# Patient Record
Sex: FEMALE | Race: WHITE | NOT HISPANIC OR LATINO | URBAN - NONMETROPOLITAN AREA
[De-identification: names, ages, dates, MRNs, and addresses within clinical notes are randomized per-mention and may not be internally consistent; named-entity substitution may affect disease eponyms.]

---

## 2018-09-04 PROBLEM — H52.4: Noted: 2021-09-13

## 2018-09-04 PROBLEM — H40.013: Noted: 2018-09-04

## 2018-09-04 PROBLEM — H00.014: Noted: 2021-03-11

## 2018-09-04 PROBLEM — H52.03: Noted: 2019-09-12

## 2018-09-04 PROBLEM — H52.4: Noted: 2021-03-11

## 2018-09-04 PROBLEM — H25.13: Noted: 2018-09-04

## 2018-09-04 PROBLEM — H52.4: Noted: 2018-09-04

## 2018-09-04 PROBLEM — H52.4: Noted: 2019-09-12

## 2018-09-04 PROBLEM — H52.03: Noted: 2021-03-11

## 2018-09-04 PROBLEM — H52.03: Noted: 2021-09-13

## 2019-03-04 ENCOUNTER — IMPORTED ENCOUNTER (OUTPATIENT)
Dept: URBAN - NONMETROPOLITAN AREA CLINIC 1 | Facility: CLINIC | Age: 69
End: 2019-03-04

## 2019-03-04 PROCEDURE — 92133 CPTRZD OPH DX IMG PST SGM ON: CPT

## 2019-03-04 PROCEDURE — 76514 ECHO EXAM OF EYE THICKNESS: CPT

## 2019-03-04 PROCEDURE — 99213 OFFICE O/P EST LOW 20 MIN: CPT

## 2019-03-04 NOTE — PATIENT DISCUSSION
Borderline Glaucoma OU-  discussed findings w/patient-  no known family history-  IOPs stable at 14 OU-  C/D 0.7 OD and 0.8 OS-  Pach done today 508 504-  OCT disc done today OD: R. severe thinning superior OS: R. normal RNFL-  24-2 VF done on 8/30/2018. OD: reliable with scattered defects; OS: reliable with scattered defects. There are no glaucomatous defects noted at this time. -  continue to monitor every 6 months for Complete w/ 24-2 VF and Fundus PhotosNuclear Sclerosis OU -  discussed findings w/patient-  no treatment indicated at this time -  UV protection recommended-  monitor 6 month Complete or prn; 's Notes: MR TERRY OCT disc 3/4/2019Pach 3/1/2019 [place] 508 504VF 24-2 8/30/2018

## 2019-09-12 ENCOUNTER — IMPORTED ENCOUNTER (OUTPATIENT)
Dept: URBAN - NONMETROPOLITAN AREA CLINIC 1 | Facility: CLINIC | Age: 69
End: 2019-09-12

## 2019-09-12 PROCEDURE — 92014 COMPRE OPH EXAM EST PT 1/>: CPT

## 2019-09-12 PROCEDURE — 92015 DETERMINE REFRACTIVE STATE: CPT

## 2019-09-12 PROCEDURE — 92083 EXTENDED VISUAL FIELD XM: CPT

## 2019-09-12 NOTE — PATIENT DISCUSSION
Borderline Glaucoma OU-  discussed findings w/patient-  no known family history-  IOPs stable at 15 OU-  C/D 0.7 OD and 0.8 OS-  Fundus Photos done today. Baseline OD: 0.7/0.7 suspicious cupping OS: 0.8/0.8 suspicious cupping C/D asymmetry-  24-2 VF done today. OD: R scattered/non-specific defects; OS: R scattered/non-specific defects. VF results were stable from previous last year. -  Pach done 3/1/2019 508 504-  OCT disc done today OD: R. severe thinning superior OS: R. normal RNFL-  24-2 VF done on 8/30/2018. OD: reliable with scattered defects; OS: reliable with scattered defects. There are no glaucomatous defects noted at this time. -  continue to monitor every 6 months f/u BL GL w/ OCT ONH and Gonio Nuclear Sclerosis OU -  discussed findings w/patient-  no treatment indicated at this time stable from previous-  UV protection recommended-  monitor 6 month f/u or prn Simple Hyperopia OU w/Presbyopia-  discussed findings w/patient-  new spectacle Rx issued-  monitor yearly or prn; 's Notes: MR 9/12/2019DFE OCT disc 3/4/2019Pach 3/1/2019 [place] 508 504VF 24-2 9/12/2019Fundus Photos 9/12/2019

## 2020-03-12 ENCOUNTER — IMPORTED ENCOUNTER (OUTPATIENT)
Dept: URBAN - NONMETROPOLITAN AREA CLINIC 1 | Facility: CLINIC | Age: 70
End: 2020-03-12

## 2020-03-12 PROCEDURE — 92133 CPTRZD OPH DX IMG PST SGM ON: CPT

## 2020-03-12 PROCEDURE — 92020 GONIOSCOPY: CPT

## 2020-03-12 PROCEDURE — 99213 OFFICE O/P EST LOW 20 MIN: CPT

## 2020-03-12 NOTE — PATIENT DISCUSSION
Borderline Glaucoma OU-  discussed findings w/patient-  no known family history-  IOPs stable at 15 OU-  C/D 0.7 OD and 0.8 OS-  Fundus Photos done today. Baseline OD: 0.7/0.7 suspicious cupping OS: 0.8/0.8 suspicious cupping C/D asymmetry-  24-2 VF done today. OD: R scattered/non-specific defects; OS: R scattered/non-specific defects. VF results were stable from previous last year. -  Pach done 3/1/2019 508 504-  OCT disc done today OD: 10/10SS 87 um normal RNFL  OS: 9/10 SS 85 um normal RNFL -  24-2 VF done on 8/30/2018. OD: reliable with scattered defects; OS: reliable with scattered defects. There are no glaucomatous defects noted at this time. -  continue to monitor every 6 months Complete w/ 24-2 VF and Fundus PhotosNuclear Sclerosis OU -  discussed findings w/patient-  no treatment indicated at this time stable from previous-  UV protection recommended-  monitor 6 month f/u or prn Simple Hyperopia OU w/Presbyopia-  discussed findings w/patient-  new spectacle Rx issued-  monitor yearly or prn; 's Notes: MR 9/12/2019DFE Gonio 3/12/2020OCT disc 3/12/2020Pach 3/1/2019 [place] 508 504VF 24-2 9/12/2019Fundus Photos 9/12/2019

## 2020-09-10 ENCOUNTER — IMPORTED ENCOUNTER (OUTPATIENT)
Dept: URBAN - NONMETROPOLITAN AREA CLINIC 1 | Facility: CLINIC | Age: 70
End: 2020-09-10

## 2020-09-10 PROCEDURE — 92014 COMPRE OPH EXAM EST PT 1/>: CPT

## 2020-09-10 PROCEDURE — 92015 DETERMINE REFRACTIVE STATE: CPT

## 2020-09-10 PROCEDURE — 92083 EXTENDED VISUAL FIELD XM: CPT

## 2020-09-10 NOTE — PATIENT DISCUSSION
Borderline Glaucoma OU-  Discussed findings w/patient-  No known family history-  IOPs better today at 13 OU-  C/D 0.7 OD and 0.8 OS-  Fundus Photos done 9/12/2019 baseline OD: 0.7/0.7 suspicious cupping OS: 0.8/0.8 suspicious cupping C/D asymmetry-  24-2 VF done 9/10/2020OD: R scattered/non-specific defects; OS: R scattered/non-specific defects. VF results were stable from previous last year. -  Pach done 3/1/2019:  OD:  508 OS:  504-  OCT disc done previously:  OD: 10/10SS 87 um normal RNFL  OS: 9/10 SS 85 um normal RNFL -  RTC 6 mo f/u glaucoma suspect w/ OCTNuclear Sclerosis OU -  discussed findings w/patient-  no treatment indicated at this time stable from previous-  UV protection recommended-  monitor 6 month f/u or prn Simple Hyperopia OU w/Presbyopia-  discussed findings w/patient-  new spectacle Rx issued-  monitor yearly or prn; 's Notes: MR 9/10/2020DFE 9/10/2020Gonio 3/12/2020OCT disc 3/12/2020Pach 3/1/2019 [place] 508 504VF 24-2 9/10/2020Fundus Photos 9/12/2019

## 2021-03-11 ENCOUNTER — IMPORTED ENCOUNTER (OUTPATIENT)
Dept: URBAN - NONMETROPOLITAN AREA CLINIC 1 | Facility: CLINIC | Age: 71
End: 2021-03-11

## 2021-03-11 PROCEDURE — 92133 CPTRZD OPH DX IMG PST SGM ON: CPT

## 2021-03-11 PROCEDURE — 92014 COMPRE OPH EXAM EST PT 1/>: CPT

## 2021-03-11 NOTE — PATIENT DISCUSSION
Borderline Glaucoma OU-  Discussed findings w/patient-  No known family history-  IOPs better today at 1314 stable-  C/D 0.7 OD and 0.8 OS-  Fundus Photos done 9/12/2019 baseline OD: 0.7/0.7 suspicious cupping OS: 0.8/0.8 suspicious cupping C/D asymmetry-  24-2 VF done 9/10/2020OD: R scattered/non-specific defects; OS: R scattered/non-specific defects. VF results were stable from previous last year. -  Pach done 3/1/2019:  OD:  508 OS:  504-  OCT disc done 3/11/2021:      OD: 9/10SS 85 um normal RNFL     OS: 10/10 SS 87 um normal RNFL -  RTC 6 mo f/u glaucoma suspect w/ OCTHordeolum Externum MATT-  discussed findings w/ patient -  start Maxitrol marlon TID x 7 days then d/c Rx sent to pharmacy-   start warm compresses at least QD OS-  continue to montior Nuclear Sclerosis OU -  discussed findings w/patient-  no treatment indicated at this time stable from previous-  UV protection recommended-  monitor 6 month f/u or prn Simple Hyperopia OU w/Presbyopia-  discussed findings w/patient-  new spectacle Rx issued-  monitor yearly or prn; 's Notes: MR 9/10/2020DFE 3/11/2021Gonio 3/12/2020OCT disc 3/11/2021Pach 3/1/2019 [place] 508 504VF 24-2 9/10/2020Fundus Photos 9/12/2019

## 2021-04-30 NOTE — PATIENT DISCUSSION
PATIENT IS NOT A CANDIDATE FOR VIVITY DUE TO THE IOL POWER (HIGH MYOPIA) GIVEN OPTION OF PANOPTIX VS ACTIVE FOCUS VS DISTANCE ONLY AND WEAR READING GLASSES FOR INTERMEDIATE AND NEAR VISION

## 2021-07-22 NOTE — PATIENT DISCUSSION
Continue: prednisol ace-gatiflox-bromfen (prednisol ace-gatiflox-bromfen): drops,suspension: 1-0.5-0.075% 1 drop three times a day [facility]

## 2021-07-26 NOTE — PATIENT DISCUSSION
***The patient is interested in refractive cataract surgery. After discussing all options for becoming less dependent on glasses after surgery, the patient has elected intermediate and distance vision with EDOF IOLs . The anticipated visual outcome is satisfactory distance and intermediate with some near (primarily for digital material- the patient may require glasses for some small detail and/or fine print). ***

## 2021-07-26 NOTE — PATIENT DISCUSSION
Continue: prednisol ace-gatiflox-bromfen (prednisol ace-gatiflox-bromfen): drops,suspension: 1-0.5-0.075% 1 drop three times a day [facility]

## 2021-07-26 NOTE — PATIENT DISCUSSION
***The patient is interested in refractive cataract surgery. After discussing all options for becoming less dependent on glasses after surgery, the patient is considering near, intermediate, and distance vision with multifocal IOLs . The anticipated visual outcome is satisfactory distance, intermediate, and near (patient understands she may need glasses for some near like fine print or in dim lighting). ***

## 2021-07-26 NOTE — PATIENT DISCUSSION
REFRACTIVE ERROR- OPTS DISC W/ PT. PT UNDERSTANDS AND ELECTS TO PROCEED W/ REFRACTIVE  CATARACT SURGERY. ADRIANA IS NOW AVAILABLE IN HER POWER. DISCUSSED PANOPTIX Marliss Bounds. EDUCATED PATIENT ON WHAT TO EXPECT WITH EACH LENS (POOR HISTORIAN).

## 2021-07-26 NOTE — PATIENT DISCUSSION
PT HAS A POOR MEMORY AND WOULD LIKE REMINDERS OF THE BENEFITS BETWEEN PANOPTIX AND VIVITY. SHE WOULD LIKE TO CONSIDER BOTH OPTIONS VERY CAREFULLY.

## 2021-09-13 ENCOUNTER — PREPPED CHART (OUTPATIENT)
Dept: URBAN - NONMETROPOLITAN AREA CLINIC 4 | Facility: CLINIC | Age: 71
End: 2021-09-13

## 2021-09-13 ENCOUNTER — IMPORTED ENCOUNTER (OUTPATIENT)
Dept: URBAN - NONMETROPOLITAN AREA CLINIC 1 | Facility: CLINIC | Age: 71
End: 2021-09-13

## 2021-09-13 PROCEDURE — 99214 OFFICE O/P EST MOD 30 MIN: CPT

## 2021-09-13 PROCEDURE — 92015 DETERMINE REFRACTIVE STATE: CPT

## 2021-09-13 NOTE — PATIENT DISCUSSION
Borderline Glaucoma OU-  Discussed findings w/patient-  No known family history-  IOPs better today at 14 15 stable-  C/D 0.7 OD and 0.8 OS-  Fundus Photos done 9/12/2019 baseline OD: 0.7/0.7 suspicious cupping OS: 0.8/0.8 suspicious cupping C/D asymmetry-  24-2 VF done 9/10/2020OD: R scattered/non-specific defects; OS: R scattered/non-specific defects. VF results were stable from previous last year. -  Pach done 3/1/2019:  OD:  508 OS:  504-  OCT disc done 3/11/2021:      OD: 9/10SS 85 um normal RNFL     OS: 10/10 SS 87 um normal RNFL -  RTC 6 mo f/u glaucoma suspect w/ OCT Nuclear Sclerosis OU -  discussed findings w/patient-  no treatment indicated at this time stable from previous-  UV protection recommended-  BAT done today: 20/40 20/30-  monitor 6 month f/u or prn Simple Hyperopia OU w/Presbyopia-  discussed findings w/patient-  new spectacle Rx issued-  monitor yearly or prn; 's Notes: MR 9/13/2021DFE 9/13/2021Gonio 3/12/2020OCT Livan Weeks 74 3/11/2021Pach 3/1/2019 [place] 508 504VF 24-2 9/10/2020Fundus Photos 9/12/2019

## 2021-10-05 NOTE — PATIENT DISCUSSION
***This patient had refractive cataract surgery performed. An IOL was placed to achieve a target refraction of plano (which should provide them with satisfactory vision with the possible aid of glasses/contact lenses prn). ***.

## 2021-10-05 NOTE — PATIENT DISCUSSION
Continue: prednisolone-moxiflox-bromfen (prednisolone-moxiflox-bromfen): drops,suspension: 1-0.5-0.075% 1 drop three times a day as directed into affected eye 08-.

## 2021-12-13 NOTE — PATIENT DISCUSSION
Advised patient that her vision has improved since her last visit.  Vision is testing better today than it tested with glasses prior to cataract surgery. The patient may not be tolerating the lens implant although it is important to give the brain time to adapt.  The patient understands if she decides to have the IOL removed it will be replaced with a single vision IOL but she will be dependent on glasses for all near, intermediate and distance activities. The risks and benefits of an IOL exchange have been discussed with the paitent including the compromise between a natural and an artificial lens implant.  Best vision will be acheived with glasses or contact lenses. Reassured the patient that a single vision monofcal IOL will provide her with her best distance and near vision with glasses without compromising contrast. She has a dependency on bright lighting while reading which may indicate that she is not tolerating Panoptix. Instructed patient to lubricate with aritificial tears and consider her options.

## 2022-02-10 NOTE — PATIENT DISCUSSION
Advised the patient that visual symptoms are consistent with migraine aura. No evidence of associated ocular pathology was noted in either eye. A course of observation was recommended. It was explained that migraine auras typically follow a pattern: present as recurrent blind spots or flashing lights that change in shape, size and location, affect both eyes, last between 10 and 60 minutes and may precede a headache. The symptoms are believed to be the result of benign vascular spasms within the head. If migrainous events increase in frequency, contact primary care physician for possible migraine management.

## 2022-03-03 NOTE — PATIENT DISCUSSION
The patient states she is tolerating her lens implants and would like to hold off on all surgical intervention.  Offered soft contact lens for treating small residual refractive error OD but pt would like to try treating the dry eye with artificial tears first.  Pt instructed to use PFAT'S 4-6 times daily and follow up in 2 weeks.

## 2022-03-03 NOTE — PATIENT DISCUSSION
Moderate Dry Eyes: Prescribed disappearing preservative or preservative-free artificial tears 4-6x per day OU . Suggest she add nightly lubricating ointment or gel. Pt has a history of silicone allergy therefore punctal plugs are not an options for her. defers punctal plugs today.

## 2022-03-10 ASSESSMENT — VISUAL ACUITY
OS_CC: 20/20
OU_CC: 20/20
OS_BAT: 20/30
OD_BAT: 20/40
OD_CC: 20/25+2

## 2022-03-10 ASSESSMENT — TONOMETRY
OS_IOP_MMHG: 15
OD_IOP_MMHG: 14

## 2022-03-16 ENCOUNTER — COMPREHENSIVE EXAM (OUTPATIENT)
Dept: URBAN - NONMETROPOLITAN AREA CLINIC 4 | Facility: CLINIC | Age: 72
End: 2022-03-16

## 2022-03-16 DIAGNOSIS — H40.013: ICD-10-CM

## 2022-03-16 PROCEDURE — 92133 CPTRZD OPH DX IMG PST SGM ON: CPT

## 2022-03-16 PROCEDURE — 99214 OFFICE O/P EST MOD 30 MIN: CPT

## 2022-03-16 ASSESSMENT — VISUAL ACUITY
OD_CC: 20/25
OS_CC: 20/20

## 2022-03-16 ASSESSMENT — TONOMETRY
OS_IOP_MMHG: 15
OD_IOP_MMHG: 15

## 2022-03-18 NOTE — PATIENT DISCUSSION
Offered IOL exchange, patient declined and elects to proceed with the yag laser as she is deciding to keep the Panoptix lenses.

## 2022-03-29 NOTE — PATIENT DISCUSSION
Patient has concerns about not being able to have an IOL Exchange years down the road if she wanted to after the YAG laser, it was explained to the patient that regardless of having the YAG laser or not K[facility] would not remove the lens years down the road because as time passes the lenses are fibrosing into place. The patient understands if she wants to take the panoptix lenses out, now would be the only time to do it and she understands it would be replaced with a standard lens and she would need glasses for all distances. Pt was reassured that the Laser does not have any significant risks and wont cause an increase risk of acquiring other ocular diseases. It was explained that the YAG laser is going to open up the membrane and allow more light in. It was explained to the patient that visual acuity testing shows that her uncorrected distance vision is satisfactory. The YAG laser will not prevent her from being able to get glasses if she desires in the future. Patient would like to keep her panoptix IOL  and desires to proceed with the YAG capsulotomy.

## 2022-04-01 NOTE — PATIENT DISCUSSION
Visually significant. Vision improved with Yag OD and she would like to proceed with Yag OS, as scheduled.

## 2022-04-09 ASSESSMENT — TONOMETRY
OS_IOP_MMHG: 15
OD_IOP_MMHG: 14
OD_IOP_MMHG: 14
OS_IOP_MMHG: 14
OD_IOP_MMHG: 13
OS_IOP_MMHG: 15
OS_IOP_MMHG: 13
OD_IOP_MMHG: 15
OS_IOP_MMHG: 15
OD_IOP_MMHG: 15
OS_IOP_MMHG: 15
OD_IOP_MMHG: 14

## 2022-04-09 ASSESSMENT — PACHYMETRY
OS_CT_UM: 492; ADJ: VTHIN
OD_CT_UM: 504; ADJ: VTHIN
OS_CT_UM: 492; ADJ: VTHIN
OS_CT_UM: 492; ADJ: VTHIN
OD_CT_UM: 504; ADJ: VTHIN
OS_CT_UM: 492; ADJ: VTHIN
OD_CT_UM: 504; ADJ: VTHIN
OD_CT_UM: 504; ADJ: VTHIN

## 2022-04-09 ASSESSMENT — VISUAL ACUITY
OS_SC: J1
OU_CC: 20/20
OS_CC: 20/30+2
OU_CC: 20/20
OD_CC: 20/40
OD_PH: 20/30
OD_CC: 20/30-2
OS_CC: 20/40+2
OS_SC: 20/20
OS_GLARE: 20/25
OS_CC: J1
OD_CC: 20/30-1
OD_CC: 20/30-2
OS_GLARE: 20/30
OS_CC: 20/30+2
OD_GLARE: 20/40
OS_PH: 20/30
OD_SC: 20/25+2
OD_SC: 20/20-2
OU_SC: 20/20
OD_SC: J1
OD_GLARE: 20/25
OD_CC: J1
OS_SC: 20/20-1
OS_CC: 20/40

## 2023-04-09 NOTE — PATIENT DISCUSSION
**For patient's undergoing cataract surgery. In the event you decline a refractive package, traditional cataract surgery will be performed and a monofocal IOL will be implanted to target distance vision (or plano). [facility]

## 2024-03-01 ENCOUNTER — COMPREHENSIVE EXAM (OUTPATIENT)
Dept: URBAN - NONMETROPOLITAN AREA CLINIC 4 | Facility: CLINIC | Age: 74
End: 2024-03-01

## 2024-03-01 DIAGNOSIS — H25.13: ICD-10-CM

## 2024-03-01 DIAGNOSIS — H52.03: ICD-10-CM

## 2024-03-01 DIAGNOSIS — H52.4: ICD-10-CM

## 2024-03-01 DIAGNOSIS — H40.013: ICD-10-CM

## 2024-03-01 PROCEDURE — 92014 COMPRE OPH EXAM EST PT 1/>: CPT

## 2024-03-01 PROCEDURE — 92133 CPTRZD OPH DX IMG PST SGM ON: CPT

## 2024-03-01 ASSESSMENT — TONOMETRY
OS_IOP_MMHG: 16
OD_IOP_MMHG: 15

## 2024-03-01 ASSESSMENT — VISUAL ACUITY
OD_SC: 20/25-1
OS_SC: 20/25

## 2024-11-05 ENCOUNTER — FOLLOW UP (OUTPATIENT)
Dept: URBAN - NONMETROPOLITAN AREA CLINIC 4 | Facility: CLINIC | Age: 74
End: 2024-11-05

## 2024-11-05 DIAGNOSIS — H40.013: ICD-10-CM

## 2024-11-05 DIAGNOSIS — H25.13: ICD-10-CM

## 2024-11-05 DIAGNOSIS — H52.03: ICD-10-CM

## 2024-11-05 DIAGNOSIS — H52.4: ICD-10-CM

## 2024-11-05 PROCEDURE — 92083 EXTENDED VISUAL FIELD XM: CPT

## 2024-11-05 PROCEDURE — 92014 COMPRE OPH EXAM EST PT 1/>: CPT

## 2025-04-30 ENCOUNTER — COMPREHENSIVE EXAM (OUTPATIENT)
Age: 75
End: 2025-04-30

## 2025-04-30 DIAGNOSIS — H40.013: ICD-10-CM

## 2025-04-30 DIAGNOSIS — H52.03: ICD-10-CM

## 2025-04-30 DIAGNOSIS — H25.13: ICD-10-CM

## 2025-04-30 DIAGNOSIS — H52.4: ICD-10-CM

## 2025-04-30 PROCEDURE — 92133 CPTRZD OPH DX IMG PST SGM ON: CPT

## 2025-04-30 PROCEDURE — 99214 OFFICE O/P EST MOD 30 MIN: CPT
